# Patient Record
Sex: MALE | Race: WHITE | NOT HISPANIC OR LATINO | URBAN - NONMETROPOLITAN AREA
[De-identification: names, ages, dates, MRNs, and addresses within clinical notes are randomized per-mention and may not be internally consistent; named-entity substitution may affect disease eponyms.]

---

## 2019-03-15 ENCOUNTER — IMPORTED ENCOUNTER (OUTPATIENT)
Dept: URBAN - NONMETROPOLITAN AREA CLINIC 1 | Facility: CLINIC | Age: 59
End: 2019-03-15

## 2019-03-15 PROBLEM — H52.4: Noted: 2019-03-15

## 2019-03-15 PROBLEM — H52.03: Noted: 2019-03-15

## 2019-03-15 PROBLEM — H52.223: Noted: 2019-03-15

## 2019-03-15 PROCEDURE — S0620 ROUTINE OPHTHALMOLOGICAL EXA: HCPCS

## 2019-03-15 NOTE — PATIENT DISCUSSION
Compound Hyperopic Astigmatism OU w/Presbyopia -  discussed findings w/patient-  new spectacle Rx issued-  monitor yearly or prn; 's Notes: MR 3/15/2019DFE 3/15/2019

## 2022-04-09 ASSESSMENT — VISUAL ACUITY
OS_CC: 20/70
OD_CC: 20/60

## 2022-04-09 ASSESSMENT — TONOMETRY
OD_IOP_MMHG: 12
OS_IOP_MMHG: 12